# Patient Record
(demographics unavailable — no encounter records)

---
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
## 2024-12-30 NOTE — DISCUSSION/SUMMARY
[FreeTextEntry1] : Labs sent including lipids, hemoglobin A1c, proBNP.  Schedule coronary artery calcium score.  Depending on results we will proceed further if necessary. [EKG obtained to assist in diagnosis and management of assessed problem(s)] : EKG obtained to assist in diagnosis and management of assessed problem(s)

## 2024-12-30 NOTE — REASON FOR VISIT
[FreeTextEntry1] : 58-year-old man, son of my patients, with hypertension and a family history of coronary disease here for cardiac evaluation.
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
## 2024-12-30 NOTE — PHYSICAL EXAM
[Well Developed] : well developed [Well Nourished] : well nourished [No Acute Distress] : no acute distress [Normal Conjunctiva] : normal conjunctiva [Normal Venous Pressure] : normal venous pressure [No Carotid Bruit] : no carotid bruit [Normal S1, S2] : normal S1, S2 [No Murmur] : no murmur [No Rub] : no rub [No Gallop] : no gallop [Clear Lung Fields] : clear lung fields [Good Air Entry] : good air entry [No Respiratory Distress] : no respiratory distress  [Soft] : abdomen soft [Non Tender] : non-tender [No Masses/organomegaly] : no masses/organomegaly [Normal Bowel Sounds] : normal bowel sounds [Normal Gait] : normal gait [No Cyanosis] : no cyanosis [No Clubbing] : no clubbing [No Varicosities] : no varicosities [Normal Radial B/L] : normal radial B/L [Normal DP B/L] : normal DP B/L [No Rash] : no rash [Moves all extremities] : moves all extremities [Normal Speech] : normal speech [Alert and Oriented] : alert and oriented [Normal memory] : normal memory [de-identified] : Normal PMI. [de-identified] : Neck and spine curvature status post surgery. [de-identified] : 2+ pedal edema one third up with chronic skin changes left, 1+ right. [de-identified] : PT pulses diminished bilaterally [de-identified] : Chronic skin changes in both lower extremities probably from his obesity and prolonged edema [de-identified] : Walks with a cane and cannot stand straight

## 2024-12-30 NOTE — CARDIOLOGY SUMMARY
[de-identified] : April 13, 2022.  Normal sinus rhythm at 83 with a left anterior hemiblock and poor R wave progression.  Otherwise normal [de-identified] : October 2021.  Reportedly had normal pharmacologic nuclear study.
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
## 2024-12-30 NOTE — PHYSICAL EXAM
[Well Developed] : well developed [Well Nourished] : well nourished [No Acute Distress] : no acute distress [Normal Conjunctiva] : normal conjunctiva [Normal Venous Pressure] : normal venous pressure [No Carotid Bruit] : no carotid bruit [Normal S1, S2] : normal S1, S2 [No Murmur] : no murmur [No Rub] : no rub [No Gallop] : no gallop [Clear Lung Fields] : clear lung fields [Good Air Entry] : good air entry [No Respiratory Distress] : no respiratory distress  [Soft] : abdomen soft [Non Tender] : non-tender [No Masses/organomegaly] : no masses/organomegaly [Normal Bowel Sounds] : normal bowel sounds [Normal Gait] : normal gait [No Cyanosis] : no cyanosis [No Clubbing] : no clubbing [No Varicosities] : no varicosities [Normal Radial B/L] : normal radial B/L [Normal DP B/L] : normal DP B/L [No Rash] : no rash [Moves all extremities] : moves all extremities [Normal Speech] : normal speech [Alert and Oriented] : alert and oriented [Normal memory] : normal memory [de-identified] : Normal PMI. [de-identified] : Neck and spine curvature status post surgery. [de-identified] : 2+ pedal edema one third up with chronic skin changes left, 1+ right. [de-identified] : PT pulses diminished bilaterally [de-identified] : Chronic skin changes in both lower extremities probably from his obesity and prolonged edema [de-identified] : Walks with a cane and cannot stand straight

## 2024-12-30 NOTE — CARDIOLOGY SUMMARY
[de-identified] : April 13, 2022.  Normal sinus rhythm at 83 with a left anterior hemiblock and poor R wave progression.  Otherwise normal [de-identified] : October 2021.  Reportedly had normal pharmacologic nuclear study.